# Patient Record
Sex: FEMALE | Race: WHITE
[De-identification: names, ages, dates, MRNs, and addresses within clinical notes are randomized per-mention and may not be internally consistent; named-entity substitution may affect disease eponyms.]

---

## 2019-12-13 ENCOUNTER — HOSPITAL ENCOUNTER (EMERGENCY)
Dept: HOSPITAL 41 - JD.ED | Age: 39
Discharge: HOME | End: 2019-12-13
Payer: COMMERCIAL

## 2019-12-13 DIAGNOSIS — Z88.1: ICD-10-CM

## 2019-12-13 DIAGNOSIS — S50.11XA: Primary | ICD-10-CM
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
## 2019-12-13 NOTE — CR
Right forearm: Two views of the right forearm were obtained.

 

Comparison: No previous forearm study.

 

No fracture or other bony abnormality is appreciated.

 

Impression:

1.  No abnormality is appreciated on two-view right forearm study.

 

Diagnostic code #1

 

This report was dictated in [place] Standard Time

## 2019-12-13 NOTE — CR
Chest: PA view of the chest was obtained.

 

Comparison: No prior chest x-ray.

 

Heart size and mediastinum are normal.  Lungs are clear with no acute 

parenchymal change.  Bony structures are grossly intact.

 

Impression:

1.  Nothing acute is seen on PA chest x-ray.

 

Diagnostic code #1

 

This report was dictated in [place] Standard Time